# Patient Record
Sex: MALE | Race: WHITE
[De-identification: names, ages, dates, MRNs, and addresses within clinical notes are randomized per-mention and may not be internally consistent; named-entity substitution may affect disease eponyms.]

---

## 2017-09-06 ENCOUNTER — HOSPITAL ENCOUNTER (EMERGENCY)
Dept: HOSPITAL 74 - JER | Age: 67
Discharge: HOME | End: 2017-09-06
Payer: COMMERCIAL

## 2017-09-06 VITALS — SYSTOLIC BLOOD PRESSURE: 160 MMHG | DIASTOLIC BLOOD PRESSURE: 85 MMHG

## 2017-09-06 VITALS — BODY MASS INDEX: 29 KG/M2

## 2017-09-06 VITALS — TEMPERATURE: 98.8 F | HEART RATE: 91 BPM

## 2017-09-06 DIAGNOSIS — Z87.891: ICD-10-CM

## 2017-09-06 DIAGNOSIS — E78.00: ICD-10-CM

## 2017-09-06 DIAGNOSIS — I10: ICD-10-CM

## 2017-09-06 DIAGNOSIS — K21.9: ICD-10-CM

## 2017-09-06 DIAGNOSIS — Z01.31: Primary | ICD-10-CM

## 2017-09-06 DIAGNOSIS — J44.9: ICD-10-CM

## 2017-09-06 NOTE — PDOC
History of Present Illness





- General


Chief Complaint: Blood Pressure Problem


Stated Complaint: BLOOD PRESSURE


Time Seen by Provider: 09/06/17 22:29





- History of Present Illness


Initial Comments: 


09/06/17 22:49


CHIEF COMPLAINT:  blood pressure concern





HISTORY OF PRESENT ILLNESS: 67 years old male with a significant past medical 

history of COPD, benign lung mass s/p LLL resection, pneumonia, MI s/p stent, 

HTN, HLD, GERD, BPH, anemia who presents to the emergency department with 

concern of elevated blood pressure today. Patient states he had a dental 

procedure and was given anesthesia to the right side of his face today, and 

afterwards he was measuring his blood pressure at home and was concerned 

because it "kept going up and up" with the highest BP measuring in the 180s/

100s.  Patient denies any chest pain, shortness of breath, or palpitations, and 

on arrival to ED with BP of 160/85 patient states "oh ok, then I'm ok, I can go 

home now."  Patient continuously denies any other symptoms and states he will 

follow up with his PCP. 





No recent travel or sick contacts. 





PAST MEDICAL HISTORY: Denies past medical history





FAMILY HISTORY: Denies





SOCIAL HISTORY: Denies tobacco, alcohol, illicit drug use. 





SURGICAL HISTORY: Denies





ALLERGIES: levofloxacin





REVIEW OF SYSTEMS


General/Constitutional: Denies fever or chills. Denies weakness, weight change.





HEENT: Denies change in vision. Denies ear pain or discharge. Denies sore 

throat.





Cardiovascular: Denies chest pain or shortness of breath.





Respiratory: Denies cough, wheezing, or hemoptysis.





Gastrointestinal: Denies nausea, vomiting, diarrhea or constipation. Denies 

rectal bleeding.





Genitourinary: Denies dysuria, frequency, or change in urination.





Musculoskeletal: Denies joint or muscle swelling or pain. Denies neck or back 

pain.





Skin and breasts: Denies rash or easy bruising.





Neurologic: Denies headache, vertigo, loss of consciousness, or loss of 

sensation.








PHYSICAL EXAM


General Appearance: Well-appearing, appropriately dressed.  No apparent 

distress.





HEENT: EOMI, PERRLA, normal ENT inspection, normal voice, TMs normal, pharynx 

normal.  No conjunctival pallor.  No photophobia, scleral icterus.





Neck: Supple.  Trachea midline. No tenderness, rigidity, carotid bruit, stridor

, lymphadenopathy, or thyromegaly. 





Respiratory/Chest: Lungs CTAB. 





Cardiovascular: RRR. S1, S2.  





Gastrointestinal/Abdominal: Normal bowel sounds.  Abdomen soft, non-distended.  

No tenderness or rebound tenderness. No  organomegaly, pulsatile mass, guarding

, hernia, hepatomegaly, splenomegaly.





Musculoskeletal/Extremities:  Normal inspection. FROM of all extremities, 

normal capillary refill.  Pelvis Stable.  No CVA tenderness. No tenderness to 

extremities, pedal edema, swelling, erythema or deformity.





Integumentary: Appropriate color, dry, warm.  No cyanosis, erythema, jaundice 

or rash





Neurologic: CNs II-XII intact. Fully oriented, alert.  Appropriate mood/affect. 

Motor strength 5/5.  No appreciable EOM palsy, facial droop or sensory deficit.





09/06/17 23:03








Past History





- Past Medical History


Allergies/Adverse Reactions: 


 Allergies











Allergy/AdvReac Type Severity Reaction Status Date / Time


 


levofloxacin [From Levaquin] AdvReac Severe Rash Verified 01/23/17 01:31











Home Medications: 


Ambulatory Orders





Aspirin [ASA -] 81 mg PO DAILY 08/18/13 


Atorvastatin Ca [Lipitor] 40 mg PO HS 08/18/13 


Lisinopril [Prinivil] 40 mg PO BID 08/18/13 


Amlodipine Besylate [Norvasc -] 10 mg PO DAILY 12/09/13 


Ranolazine [Ranexa] 500 mg PO BID 12/09/13 


Pantoprazole Sodium [Protonix -] 40 mg PO DAILY 07/14/15 


Clopidogrel Bisulfate [Plavix -] 75 mg PO DAILY #0  07/15/15 


Metoprolol Succinate [Toprol Xl] 50 mg PO DAILY 09/06/17 


Tamsulosin HCl [Flomax] 0.4 mg PO DAILY 09/06/17 








Anemia: Yes


Asthma: No


Cancer: No


Cardiac Disorders: Yes (MI JUL 2013,STENT 8/13)


CVA: No


COPD: Yes (H/O PNEUMONIA,H/O TB 1998 COMPLETED INH,BENIGN LUNG MASS-LLL REMOVED)


CHF: No


Dementia: No


Diabetes: No


GI Disorders: Yes (GERD)


 Disorders: Yes (BPH)


HTN: Yes


Hypercholesterolemia: Yes


Liver Disease: No


Suicide Attempt (Hx): No


Seizures: No


Thyroid Disease: No





- Surgical History


Abdominal Surgery: No


Cardiac Surgery: Yes (cardiac cath 8/2013)


Cholecystectomy: Yes


Lung Surgery: Yes (lung resection,2000,BENIGN MASS)


Neurologic Surgery: No


Orthopedic Surgery: No





- Immunization History


Immunization Up to Date: No





- Psycho/Social/Smoking Cessation Hx


Anxiety: No


Suicidal Ideation: No


Smoking Status: Yes


Smoking History: Former smoker


Have you smoked in the past 12 months: No


Number of Cigarettes Smoked Daily: 0


If you are a former smoker, when did you quit?: 2000


Information on smoking cessation initiated: No


Hx Alcohol Use: No


Drug/Substance Use Hx: No


Substance Use Type: None


Hx Substance Use Treatment: No





*Physical Exam





- Vital Signs


 Last Vital Signs











Temp Pulse Resp BP Pulse Ox


 


 98.8 F   91 H  18   185/89   99 


 


 09/06/17 22:04  09/06/17 22:04  09/06/17 22:04  09/06/17 22:04  09/06/17 22:04














Medical Decision Making





- Medical Decision Making


09/06/17 23:15


67 years old male with a significant past medical history of COPD, benign lung 

mass s/p LLL resection, pneumonia, MI s/p stent, HTN, HLD, GERD, BPH, anemia 

who presents to the emergency department with concern of elevated blood 

pressure today.  





On exam patient denies any complaints and states he is reassured now that his 

blood pressure has lowered and wants to go home.  However patient is amenable 

to having an EKG performed. 





EKG NSR, no acute changes. 





Patient states he will f/u with his PCP for further monitoring and evaluation.  

Advised patient of signs and symptoms for return to ER; patient verbalized 

understanding and agrees to plan.

## 2017-09-07 NOTE — EKG
Test Reason : 

Blood Pressure : ***/*** mmHG

Vent. Rate : 068 BPM     Atrial Rate : 068 BPM

   P-R Int : 192 ms          QRS Dur : 122 ms

    QT Int : 418 ms       P-R-T Axes : 061 -56 -03 degrees

   QTc Int : 444 ms

 

NORMAL SINUS RHYTHM

LEFT ANTERIOR FASCICULAR BLOCK

ABNORMAL ECG

WHEN COMPARED WITH ECG OF 23-JAN-2017 02:22,

PREMATURE VENTRICULAR COMPLEXES ARE NO LONGER PRESENT

Confirmed by ALEISHA DELGADILLO, FRANCISCO ([id number]) on 9/7/2017 12:20:26 PM

 

Referred By:             Confirmed By:FRANCISCO MORAES MD

## 2017-09-21 ENCOUNTER — HOSPITAL ENCOUNTER (OUTPATIENT)
Dept: HOSPITAL 74 - JER | Age: 67
Setting detail: OBSERVATION
LOS: 1 days | Discharge: ANOTHER HOSPITAL | End: 2017-09-22
Attending: FAMILY MEDICINE | Admitting: FAMILY MEDICINE
Payer: COMMERCIAL

## 2017-09-21 VITALS — BODY MASS INDEX: 29 KG/M2

## 2017-09-21 DIAGNOSIS — I25.2: ICD-10-CM

## 2017-09-21 DIAGNOSIS — Z79.01: ICD-10-CM

## 2017-09-21 DIAGNOSIS — I10: Primary | ICD-10-CM

## 2017-09-21 DIAGNOSIS — Z90.2: ICD-10-CM

## 2017-09-21 DIAGNOSIS — Z95.5: ICD-10-CM

## 2017-09-21 DIAGNOSIS — I25.10: ICD-10-CM

## 2017-09-21 DIAGNOSIS — K21.9: ICD-10-CM

## 2017-09-21 DIAGNOSIS — J44.9: ICD-10-CM

## 2017-09-21 DIAGNOSIS — Z86.2: ICD-10-CM

## 2017-09-21 DIAGNOSIS — E78.5: ICD-10-CM

## 2017-09-21 DIAGNOSIS — N40.0: ICD-10-CM

## 2017-09-21 DIAGNOSIS — Z88.1: ICD-10-CM

## 2017-09-21 DIAGNOSIS — Z79.82: ICD-10-CM

## 2017-09-21 DIAGNOSIS — Z86.11: ICD-10-CM

## 2017-09-21 LAB
ALBUMIN SERPL-MCNC: 4.2 G/DL (ref 3.4–5)
ALP SERPL-CCNC: 63 U/L (ref 45–117)
ALT SERPL-CCNC: 29 U/L (ref 12–78)
ANION GAP SERPL CALC-SCNC: 11 MMOL/L (ref 8–16)
AST SERPL-CCNC: 12 U/L (ref 15–37)
BASOPHILS # BLD: 0.7 % (ref 0–2)
BILIRUB SERPL-MCNC: 1.1 MG/DL (ref 0.2–1)
CALCIUM SERPL-MCNC: 9.6 MG/DL (ref 8.5–10.1)
CK SERPL-CCNC: 65 IU/L (ref 39–308)
CK SERPL-CCNC: 77 IU/L (ref 39–308)
CO2 SERPL-SCNC: 27 MMOL/L (ref 21–32)
CREAT SERPL-MCNC: 1.5 MG/DL (ref 0.7–1.3)
DEPRECATED RDW RBC AUTO: 13.9 % (ref 11.9–15.9)
EOSINOPHIL # BLD: 1.4 % (ref 0–4.5)
GLUCOSE SERPL-MCNC: 107 MG/DL (ref 74–106)
INR BLD: 1.25 (ref 0.82–1.09)
MAGNESIUM SERPL-MCNC: 2.3 MG/DL (ref 1.8–2.4)
MCH RBC QN AUTO: 27.7 PG (ref 25.7–33.7)
MCHC RBC AUTO-ENTMCNC: 33.5 G/DL (ref 32–35.9)
MCV RBC: 82.7 FL (ref 80–96)
NEUTROPHILS # BLD: 71.9 % (ref 42.8–82.8)
PLATELET # BLD AUTO: 265 K/MM3 (ref 134–434)
PMV BLD: 7.2 FL (ref 7.5–11.1)
PROT SERPL-MCNC: 7.7 G/DL (ref 6.4–8.2)
PT PNL PPP: 13.8 SEC (ref 9.98–11.88)
TROPONIN I SERPL-MCNC: < 0.02 NG/ML (ref 0–0.05)
TROPONIN I SERPL-MCNC: < 0.02 NG/ML (ref 0–0.05)
WBC # BLD AUTO: 8.3 K/MM3 (ref 4–10)

## 2017-09-21 PROCEDURE — G0378 HOSPITAL OBSERVATION PER HR: HCPCS

## 2017-09-21 RX ADMIN — RANOLAZINE SCH MG: 500 TABLET, FILM COATED, EXTENDED RELEASE ORAL at 21:03

## 2017-09-21 RX ADMIN — NITROGLYCERIN SCH INCH: 20 OINTMENT TOPICAL at 23:37

## 2017-09-21 NOTE — PDOC
Attending Attestation





- HPI


HPI: 





09/21/17 14:31





The patient is a 67 year old male with history of hypertension, hyperlipidemia, 

CAD s/p MI s/p stent, sent to the ED by his PCP for 3 days of chest pain. He 

describes his chest pain as left sided, sharp/stabbing, not radiating or 

migrating, ranked 4/10 in intensity. No modifying factors. No nausea, vomiting, 

or diaphoresis. No lightheadedness, palpitations, SOB, or peripheral edema. 

Seen by his PCP this morning who sent him to ED for further evaluation. 





PCP: Dr. Salcedo





- Physicial Exam


PE: 





09/21/17 14:33


GENERAL: Awake, alert, and fully oriented, in no acute distress


HEAD: No signs of trauma


EYES: PERRLA, EOMI, sclera anicteric, conjunctiva clear


ENT: Auricles normal inspection, hearing grossly normal, nares patent, 

oropharynx clear without exudates. Moist mucosa


NECK: Normal ROM, supple, no lymphadenopathy, JVD, or masses


LUNGS: Breath sounds equal, clear to auscultation bilaterally.  No wheezes, and 

no crackles


HEART: Regular rate and rhythm, normal S1 and S2, no murmurs, rubs or gallops


ABDOMEN: Soft, nontender, normoactive bowel sounds.  No guarding, no rebound.  

No masses


EXTREMITIES: Normal range of motion, no edema.  No clubbing or cyanosis. No 

cords, erythema, or tenderness


NEUROLOGICAL: Cranial nerves II through XII grossly intact.  Normal speech, 

normal gait


SKIN: Warm, Dry, normal turgor, no rashes or lesions noted.








- Medical Decision Making





09/21/17 14:33





Documentation prepared by Katty Cruz, acting as medical scribe for Zaki Morales MD.





<Katty Cruz - Last Filed: 09/21/17 14:31>





- Resident


Resident Name: Kathi Cervantes





- ED Attending Attestation


I have performed the following: I have examined & evaluated the patient, The 

case was reviewed & discussed with the resident, I agree w/resident's findings 

& plan, Exceptions are as noted





- Medical Decision Making


09/21/17 14:25





 Vital Signs











Temp Pulse Resp BP Pulse Ox


 


 97.9 F   78   18   160/88   96 


 


 09/21/17 13:29  09/21/17 13:29  09/21/17 13:29  09/21/17 13:29  09/21/17 14:07











67-year-old male with history of hypertension, COPD, coronary disease status 

post stent sent in by his primary care physician Dr. Salcedo for chest pain. The 

patient reports 3 days constant midsternal sharp occasionally pressure-like 

chest pain or shortness of breath. Does not have an exertional component. 

Denies recent illnesses, fevers or chills. Denies history of pulmonary embolism 

or pleuritic component.





We'll need to rule out MI. EKG, chest x-ray, aspirin admission to the hospital 

for further evaluation.





<Zaki Morales - Last Filed: 09/21/17 14:58>





**Heart Score/ECG Review





- History


History: Moderately suspicious





- Electrocardiogram


EKG: Non specific repolarization disturbance





- Age


Age: >/= 65





- Risk Factors


Based on the list above the patient has:: >/=3 risk factors or Hx 

atherosclerotic disease


  ** #1


ECG reviewed & interpreted by me at: 14:55





09/21/17 14:58


NSR 60 TWI III LAFB, no std/janie,  msec





<Zaki Morales - Last Filed: 09/21/17 14:58>

## 2017-09-21 NOTE — PDOC
History of Present Illness





- General


Chief Complaint: Chest Pain


Stated Complaint: CHEST PAIN


Time Seen by Provider: 09/21/17 13:49





Past History





- Past Medical History


Allergies/Adverse Reactions: 


 Allergies











Allergy/AdvReac Type Severity Reaction Status Date / Time


 


levofloxacin [From Levaquin] AdvReac Severe Rash Verified 09/21/17 13:32











Home Medications: 


Ambulatory Orders





Aspirin [ASA -] 81 mg PO DAILY 08/18/13 


Atorvastatin Ca [Lipitor] 40 mg PO HS 08/18/13 


Lisinopril [Prinivil] 40 mg PO BID 08/18/13 


Amlodipine Besylate [Norvasc -] 10 mg PO DAILY 12/09/13 


Ranolazine [Ranexa] 500 mg PO BID 12/09/13 


Pantoprazole Sodium [Protonix -] 40 mg PO DAILY 07/14/15 


Clopidogrel Bisulfate [Plavix -] 75 mg PO DAILY #0  07/15/15 


Metoprolol Succinate [Toprol Xl] 50 mg PO DAILY 09/06/17 


Tamsulosin HCl [Flomax] 0.4 mg PO DAILY 09/06/17 








Anemia: Yes


Asthma: No


Cancer: No


Cardiac Disorders: Yes (MI JUL 2013,STENT 8/13)


CVA: No


COPD: Yes (H/O PNEUMONIA,H/O TB 1998 COMPLETED INH,BENIGN LUNG MASS-LLL REMOVED)


CHF: No


Dementia: No


Diabetes: No


GI Disorders: Yes (GERD)


 Disorders: Yes (BPH)


HTN: Yes


Hypercholesterolemia: Yes


Liver Disease: No


Seizures: No


Thyroid Disease: No





- Surgical History


Abdominal Surgery: No


Cardiac Surgery: Yes (cardiac cath 8/2013)


Cholecystectomy: Yes


Lung Surgery: Yes (lung resection,2000,BENIGN MASS)


Neurologic Surgery: No


Orthopedic Surgery: No





- Immunization History


Immunization Up to Date: No





- Suicide/Smoking/Psychosocial Hx


Smoking Status: Yes


Smoking History: Never smoked


Have you smoked in the past 12 months: No


Number of Cigarettes Smoked Daily: 0


If you are a former smoker, when did you quit?: 2000


Information on smoking cessation initiated: No


Hx Alcohol Use: No


Drug/Substance Use Hx: No


Substance Use Type: None


Hx Substance Use Treatment: No





*Physical Exam





- Vital Signs


 Last Vital Signs











Temp Pulse Resp BP Pulse Ox


 


 97.9 F   78   18   160/88   96 


 


 09/21/17 13:29  09/21/17 13:29  09/21/17 13:29  09/21/17 13:29  09/21/17 14:07
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
## 2017-09-21 NOTE — EKG
RX Alogliptin not covered under patients insurance - please advise    Test Reason : 

Blood Pressure : ***/*** mmHG

Vent. Rate : 061 BPM     Atrial Rate : 061 BPM

   P-R Int : 190 ms          QRS Dur : 120 ms

    QT Int : 434 ms       P-R-T Axes : 050 -52 009 degrees

   QTc Int : 436 ms

 

NORMAL SINUS RHYTHM

LEFT ANTERIOR FASCICULAR BLOCK

ABNORMAL ECG

WHEN COMPARED WITH ECG OF 06-SEP-2017 23:06,

NO SIGNIFICANT CHANGE WAS FOUND

Confirmed by FRANCISCO MORAES MD ([id number]) on 9/21/2017 3:57:44 PM

 

Referred By:             Confirmed By:FRANCISCO MORAES MD

## 2017-09-22 VITALS — HEART RATE: 63 BPM | TEMPERATURE: 97.7 F | DIASTOLIC BLOOD PRESSURE: 76 MMHG | SYSTOLIC BLOOD PRESSURE: 133 MMHG

## 2017-09-22 LAB
ALBUMIN SERPL-MCNC: 3.5 G/DL (ref 3.4–5)
ALP SERPL-CCNC: 56 U/L (ref 45–117)
ALT SERPL-CCNC: 27 U/L (ref 12–78)
ANION GAP SERPL CALC-SCNC: 8 MMOL/L (ref 8–16)
AST SERPL-CCNC: 13 U/L (ref 15–37)
BASOPHILS # BLD: 1.1 % (ref 0–2)
BILIRUB SERPL-MCNC: 0.9 MG/DL (ref 0.2–1)
CALCIUM SERPL-MCNC: 9 MG/DL (ref 8.5–10.1)
CO2 SERPL-SCNC: 29 MMOL/L (ref 21–32)
CREAT SERPL-MCNC: 1.4 MG/DL (ref 0.7–1.3)
DEPRECATED RDW RBC AUTO: 13.7 % (ref 11.9–15.9)
EOSINOPHIL # BLD: 3.2 % (ref 0–4.5)
GLUCOSE SERPL-MCNC: 99 MG/DL (ref 74–106)
MCH RBC QN AUTO: 27.8 PG (ref 25.7–33.7)
MCHC RBC AUTO-ENTMCNC: 33.9 G/DL (ref 32–35.9)
MCV RBC: 82.1 FL (ref 80–96)
NEUTROPHILS # BLD: 58.8 % (ref 42.8–82.8)
PLATELET # BLD AUTO: 232 K/MM3 (ref 134–434)
PMV BLD: 7.4 FL (ref 7.5–11.1)
PROT SERPL-MCNC: 6.7 G/DL (ref 6.4–8.2)
TROPONIN I SERPL-MCNC: < 0.02 NG/ML (ref 0–0.05)
WBC # BLD AUTO: 8.7 K/MM3 (ref 4–10)

## 2017-09-22 RX ADMIN — RANOLAZINE SCH MG: 500 TABLET, FILM COATED, EXTENDED RELEASE ORAL at 10:00

## 2017-09-22 RX ADMIN — NITROGLYCERIN SCH INCH: 20 OINTMENT TOPICAL at 13:59

## 2017-09-22 RX ADMIN — NITROGLYCERIN SCH INCH: 20 OINTMENT TOPICAL at 06:28

## 2017-09-22 NOTE — CON.CARD
Consult


Consult Specialty:: cardiology


Referred by:: Denisse


Reason for Consultation:: Chest pain





- History of Present Illness


Chief Complaint: Chest pain


History of Present Illness: 


The patient is a 67-year-old man, with a history of hypertension, hyperlipidemia

, GERD, COPD, coronary artery disease and myocardial infarction, prior stent to 

the proximal LAD and mid RCA in 2013, now presenting with recurrent chest pains 

for the past 4 days.





The patient underwent cardiac catheterization approximately one year ago, which 

showed patent stents. A recent stress test was normal. The symptoms improved 

with the nitroglycerin patch. Symptoms are not reproducible.





- History Source


History Provided By: Patient


Limitations to Obtaining History: No Limitations





- Past Medical History


CNS: No: Alzheimer's, CVA, Dementia, Migraine, Multiple Sclerosis, Peripheral 

Neuropathy, Parkinson's, Seizure, Syncope, TIA, Vertigo, Other


Cardio/Vascular: Yes: CAD (s/p stenting--LAD), HTN, Hyperlipdemia


Pulmonary: Yes: COPD, Other (lobectomy).  No: Asthma, Bronchitis, Cancer, O2 

Dependent, Pneumonia, Previously Intubated, Pulmonary Embolus, Pulmonary 

Fibrosis, Sleep Apnea


Gastrointestinal: No: Ascites, Cancer, Constipation, Crohn's Disease, 

Diverticulitis, Diverticulosis, Esophageal Varices, Gastritis, GERD, GI Bleed, 

Hemorrhoids, Hiatal Hernia, Inflamatory Bowel Disease, Irritable Bowel Disease, 

Pancreatitis, Peptic Ulcer Disease, Ulcerative Colitis, Other


Hepatobiliary: No: Cirrhosis, Cholelithiasis, Cholecystitis, Choledocholithiasis

, Hepatitis A, Hepatitis B, Hepatitis C, Other


Renal/: No: Renal Failure, Renal Inusuff, BPH, Cancer, Hematuria, Hemodialysis

, Neurogenic Bladder, Renal Calculi, UTI, Other


Heme/Onc: No: Anemia, B12 Deficiency, Bleeding Disorder, Cancer, Current 

Chemotherapy, Current Radiation Therapy, Hemochromatosis, Hypercoaguable State, 

Myeloproliferative Synd, Sickle Cell Disease, Sickle Cell Trait, 

Thrombocytopenia, Other


Infectious Disease: No: AIDS, C-Diff, Herpes Zoster, HIV, MRSA, STD's, 

Tuberculosis, VREF, Other


Psych: No: Addictions, Anxiety, Bipolar, Depression, Panic, Psychosis, 

Schizophrenia, Other


Musculoskeletal: No: Bursitis, Chronic low back pain, Hemiparesis, Hemiplegia, 

Osteoarthritis, Paraplegia, Other


Rheumatology: No: Fibromyalgia, Gout, Lupus, Rheumatoid Arthritis, Sarcoidosis, 

Vasculitis, Other


ENT: No: Allergic Rhinitis, Sinusitis, Other


Endocrine: No: Guadalupe's Disease, Cushing's Disease, Diabetes Insipidus, 

Diabetes Mellitus, Hyperparathyroidism, Hyperthyroidism, Hypothyroidism, 

Osteopenia, SIADH, Other


Dermatology: No: Basal Cell, Cellulitis, Eczema, Melanoma, Psoriasis, Squamous 

Cell, Other





- Alcohol/Substance Use


Hx Alcohol Use: No





- Smoking History


Smoking history: Never smoked


Have you smoked in the past 12 months: No


Aproximately how many cigarettes per day: 0


If you are a former smoker, when did you quit?: 2000





Home Medications





- Allergies


Allergies/Adverse Reactions: 


 Allergies











Allergy/AdvReac Type Severity Reaction Status Date / Time


 


levofloxacin [From Levaquin] AdvReac Severe Rash Verified 09/21/17 13:32














- Home Medications


Home Medications: 


Ambulatory Orders





Aspirin [ASA -] 81 mg PO DAILY 08/18/13 


Atorvastatin Ca [Lipitor] 40 mg PO HS 08/18/13 


Lisinopril [Prinivil] 40 mg PO BID 08/18/13 


Amlodipine Besylate [Norvasc -] 10 mg PO DAILY 12/09/13 


Ranolazine [Ranexa] 500 mg PO BID 12/09/13 


Pantoprazole Sodium [Protonix -] 40 mg PO DAILY 07/14/15 


Clopidogrel Bisulfate [Plavix -] 75 mg PO DAILY #0  07/15/15 


Metoprolol Succinate [Toprol Xl] 50 mg PO DAILY 09/06/17 


Tamsulosin HCl [Flomax] 0.4 mg PO DAILY 09/06/17 











Review of Systems





- Review of Systems


Constitutional: denies: No Symptoms, Chills, Diaphoresis, Fever, Lethargy, Loss 

of Appetite, Malaise, Night Sweats, Unintentional Wgt. Loss, Weakness, Other


Eyes: denies: No Symptoms, Blind Spots, Blurred Vision, Double Vision, Eye Pain

, Floaters, Photophobia, Recent Change in Vision, Other


HENT: denies: No Symptoms, Difficult Swallowing, Ear Discharge, Ear Pain, 

Epistaxis, Gingival Bleeding, Hearing Loss, Mouth Swelling, Nasal Congestion, 

Ocular Prosthesis, Throat Pain, Toothache, Ringing in Ears, Other


Neck: reports: No Symptoms


Cardiovascular: reports: Chest Pain


Respiratory: reports: No Symptoms


Gastrointestinal: reports: No Symptoms


Genitourinary: reports: No Symptoms


Breasts: reports: No Symptoms Reported


Musculoskeletal: reports: No Symptoms


Integumentary: reports: No Symptoms


Neurological: reports: No Symptoms


Endocrine: reports: No Symptoms


Vital Signs: 


 Vital Signs











Temperature  98.0 F   09/22/17 10:00


 


Pulse Rate  60   09/22/17 10:00


 


Respiratory Rate  18   09/22/17 10:00


 


Blood Pressure  123/66   09/22/17 10:00


 


O2 Sat by Pulse Oximetry (%)  94 L  09/22/17 10:00











Constitutional: Yes: Well Nourished, No Distress, Calm


Eyes: Yes: WNL, Conjunctiva Clear


HENT: Yes: WNL, Atraumatic, Normocephalic


Neck: Yes: WNL, Supple, Trachea Midline


Respiratory: Yes: WNL, Regular, CTA Bilaterally


Gastrointestinal: Yes: WNL, Normal Bowel Sounds, Soft


Renal/: Yes: WNL


Cardiovascular: Yes: Regular Rate and Rhythm


JVD: No


Carotid Bruit: No


PMI: Non-Displaced


Heart Sounds: Yes: S1, S2


Musculoskeletal: Yes: WNL


Extremities: Yes: WNL


Edema: No


Peripheral Pulses WNL: No


Integumentary: Yes: WNL


Neurological: Yes: WNL


...Motor Strength: WNL


Psychiatric: Yes: WNL





- Other Data


Labs, Other Data: 


 CBC, BMP





 09/22/17 05:30 





 09/22/17 05:30 





 INR, PTT











INR  1.25  (0.82-1.09)  H  09/21/17  14:30    








 Troponin, BNP











  09/21/17 09/22/17





  19:20 05:30


 


Troponin I  < 0.02  < 0.02








 Troponin, BNP











  09/21/17 09/22/17





  19:20 05:30


 


Troponin I  < 0.02  < 0.02














Assessment/Plan


67-year-old man with known coronary artery disease and prior stents, now 

presenting with recurrent chest pain. The patient ruled out for myocardial 

infarction. There is no CHF. No acute ECG changes. The patient is in sinus 

rhythm.





The symptoms are persisting for the past 4 days. The improved with the 

nitroglycerin patch.





Keep the patient NPO


Continue current regimen.


The patient will be transferred to [facility] for cardiac 

catheterization today.


Stable for transfer.

## 2017-09-22 NOTE — HP
Admitting History and Physical





- Admission


History of Present Illness: 


68yo M with PMH of MI with stent, htn, hld, gerd, COPD, presents c/o chest pain 

x 3 days.  Pain is in Left chest, described as a sharp/stabbing pain, non-

radiating, rated 4/10.  Pain is not worse with position change,or eating.  Pain 

is not reproducible on palpation.  Pt did not take any medication to alleviate 

the pain.  He was seen in the office given nitro with some relief


This am still with chest pain with some improvement





- Past Medical History


Cardiovascular: Yes: CAD (s/p stenting--LAD), HTN, Hyperlipdemia


Pulmonary: Yes: COPD, Other (lobectomy)





- Smoking History


Smoking history: Never smoked


Have you smoked in the past 12 months: No


Aproximately how many cigarettes per day: 0


If you are a former smoker, when did you quit?: 2000





- Alcohol/Substance Use


Hx Alcohol Use: No





Home Medications





- Allergies


Allergies/Adverse Reactions: 


 Allergies











Allergy/AdvReac Type Severity Reaction Status Date / Time


 


levofloxacin [From Levaquin] AdvReac Severe Rash Verified 09/21/17 13:32














- Home Medications


Home Medications: 


Ambulatory Orders





Aspirin [ASA -] 81 mg PO DAILY 08/18/13 


Atorvastatin Ca [Lipitor] 40 mg PO HS 08/18/13 


Lisinopril [Prinivil] 40 mg PO BID 08/18/13 


Amlodipine Besylate [Norvasc -] 10 mg PO DAILY 12/09/13 


Ranolazine [Ranexa] 500 mg PO BID 12/09/13 


Pantoprazole Sodium [Protonix -] 40 mg PO DAILY 07/14/15 


Clopidogrel Bisulfate [Plavix -] 75 mg PO DAILY #0  07/15/15 


Metoprolol Succinate [Toprol Xl] 50 mg PO DAILY 09/06/17 


Tamsulosin HCl [Flomax] 0.4 mg PO DAILY 09/06/17 











Review of Systems





- Review of Systems


Cardiovascular: reports: Chest Pain.  denies: Palpitations


Respiratory: reports: SOB on Exertion


Gastrointestinal: reports: No Symptoms


Genitourinary: reports: No Symptoms





Physical Examination


Vital Signs: 


 Vital Signs











Temperature  98.2 F   09/22/17 06:00


 


Pulse Rate  63   09/22/17 06:00


 


Respiratory Rate  20   09/22/17 06:00


 


Blood Pressure  116/67   09/22/17 06:00


 


O2 Sat by Pulse Oximetry (%)  92 L  09/21/17 21:00











Cardiovascular: Yes: Regular Rate and Rhythm


Respiratory: Yes: Regular, CTA Bilaterally


Gastrointestinal: Yes: Normal Bowel Sounds, Soft.  No: Tenderness


Edema: No


Labs: 


 CBC, BMP





 09/22/17 05:30 





 09/22/17 05:30 











Problem List





- Problems


(1) Atypical chest pain


Assessment/Plan: 


CE NEGATIVE


D/W DR GIBBS--TRANSFER FOR CATH AT [facility]


Code(s): R07.89 - OTHER CHEST PAIN





(2) Hypertension


Assessment/Plan: 


MONITOR ON CURRENT MEDS


ON ASA/PLAVIX


ON NITROPASTE


Code(s): I10 - ESSENTIAL (PRIMARY) HYPERTENSION   





(3) CAD (coronary artery disease)


Assessment/Plan: 


AS ABOVE


Code(s): I25.10 - ATHSCL HEART DISEASE OF NATIVE CORONARY ARTERY W/O ANG PCTRS

## 2018-03-02 ENCOUNTER — HOSPITAL ENCOUNTER (OUTPATIENT)
Dept: HOSPITAL 74 - JER | Age: 68
Setting detail: OBSERVATION
LOS: 1 days | Discharge: HOME | End: 2018-03-03
Attending: FAMILY MEDICINE | Admitting: FAMILY MEDICINE
Payer: COMMERCIAL

## 2018-03-02 VITALS — BODY MASS INDEX: 27.6 KG/M2

## 2018-03-02 DIAGNOSIS — I25.2: ICD-10-CM

## 2018-03-02 DIAGNOSIS — Z87.891: ICD-10-CM

## 2018-03-02 DIAGNOSIS — D64.9: ICD-10-CM

## 2018-03-02 DIAGNOSIS — Z88.1: ICD-10-CM

## 2018-03-02 DIAGNOSIS — I10: ICD-10-CM

## 2018-03-02 DIAGNOSIS — Z79.01: ICD-10-CM

## 2018-03-02 DIAGNOSIS — K62.5: Primary | ICD-10-CM

## 2018-03-02 DIAGNOSIS — N40.0: ICD-10-CM

## 2018-03-02 DIAGNOSIS — K21.9: ICD-10-CM

## 2018-03-02 DIAGNOSIS — E78.5: ICD-10-CM

## 2018-03-02 DIAGNOSIS — I25.10: ICD-10-CM

## 2018-03-02 DIAGNOSIS — J44.9: ICD-10-CM

## 2018-03-02 DIAGNOSIS — Z86.11: ICD-10-CM

## 2018-03-02 DIAGNOSIS — Z95.5: ICD-10-CM

## 2018-03-02 DIAGNOSIS — Z79.82: ICD-10-CM

## 2018-03-02 LAB
ALBUMIN SERPL-MCNC: 3.7 G/DL (ref 3.4–5)
ALP SERPL-CCNC: 42 U/L (ref 45–117)
ALT SERPL-CCNC: 18 U/L (ref 12–78)
ANION GAP SERPL CALC-SCNC: 8 MMOL/L (ref 8–16)
APTT BLD: 28.2 SECONDS (ref 26.9–34.4)
AST SERPL-CCNC: 14 U/L (ref 15–37)
BASOPHILS # BLD: 0.9 % (ref 0–2)
BILIRUB SERPL-MCNC: 0.8 MG/DL (ref 0.2–1)
BUN SERPL-MCNC: 20 MG/DL (ref 7–18)
CALCIUM SERPL-MCNC: 8.4 MG/DL (ref 8.5–10.1)
CHLORIDE SERPL-SCNC: 103 MMOL/L (ref 98–107)
CO2 SERPL-SCNC: 27 MMOL/L (ref 21–32)
CREAT SERPL-MCNC: 1.4 MG/DL (ref 0.7–1.3)
DEPRECATED RDW RBC AUTO: 13.3 % (ref 11.9–15.9)
DEPRECATED RDW RBC AUTO: 13.4 % (ref 11.9–15.9)
DEPRECATED RDW RBC AUTO: 13.5 % (ref 11.9–15.9)
EOSINOPHIL # BLD: 1.7 % (ref 0–4.5)
GLUCOSE SERPL-MCNC: 144 MG/DL (ref 74–106)
HCT VFR BLD CALC: 36.5 % (ref 35.4–49)
HCT VFR BLD CALC: 36.7 % (ref 35.4–49)
HCT VFR BLD CALC: 36.8 % (ref 35.4–49)
HGB BLD-MCNC: 12.5 GM/DL (ref 11.7–16.9)
HGB BLD-MCNC: 12.7 GM/DL (ref 11.7–16.9)
HGB BLD-MCNC: 12.8 GM/DL (ref 11.7–16.9)
INR BLD: 1.19 (ref 0.82–1.09)
LIPASE SERPL-CCNC: 174 U/L (ref 73–393)
LYMPHOCYTES # BLD: 12.6 % (ref 8–40)
MCH RBC QN AUTO: 28 PG (ref 25.7–33.7)
MCH RBC QN AUTO: 28.2 PG (ref 25.7–33.7)
MCH RBC QN AUTO: 28.4 PG (ref 25.7–33.7)
MCHC RBC AUTO-ENTMCNC: 34.2 G/DL (ref 32–35.9)
MCHC RBC AUTO-ENTMCNC: 34.5 G/DL (ref 32–35.9)
MCHC RBC AUTO-ENTMCNC: 34.7 G/DL (ref 32–35.9)
MCV RBC: 81.8 FL (ref 80–96)
MCV RBC: 81.9 FL (ref 80–96)
MCV RBC: 81.9 FL (ref 80–96)
MONOCYTES # BLD AUTO: 11 % (ref 3.8–10.2)
NEUTROPHILS # BLD: 73.8 % (ref 42.8–82.8)
PLATELET # BLD AUTO: 213 K/MM3 (ref 134–434)
PLATELET # BLD AUTO: 224 K/MM3 (ref 134–434)
PLATELET # BLD AUTO: 231 K/MM3 (ref 134–434)
PMV BLD: 7.2 FL (ref 7.5–11.1)
PMV BLD: 7.4 FL (ref 7.5–11.1)
PMV BLD: 7.5 FL (ref 7.5–11.1)
POTASSIUM SERPLBLD-SCNC: 3.2 MMOL/L (ref 3.5–5.1)
PROT SERPL-MCNC: 6.7 G/DL (ref 6.4–8.2)
PT PNL PPP: 13.5 SEC (ref 9.98–11.88)
RBC # BLD AUTO: 4.46 M/MM3 (ref 4–5.6)
RBC # BLD AUTO: 4.48 M/MM3 (ref 4–5.6)
RBC # BLD AUTO: 4.49 M/MM3 (ref 4–5.6)
SODIUM SERPL-SCNC: 138 MMOL/L (ref 136–145)
WBC # BLD AUTO: 6.1 K/MM3 (ref 4–10)
WBC # BLD AUTO: 6.7 K/MM3 (ref 4–10)
WBC # BLD AUTO: 7.2 K/MM3 (ref 4–10)

## 2018-03-02 PROCEDURE — G0378 HOSPITAL OBSERVATION PER HR: HCPCS

## 2018-03-02 PROCEDURE — 3E033GC INTRODUCTION OF OTHER THERAPEUTIC SUBSTANCE INTO PERIPHERAL VEIN, PERCUTANEOUS APPROACH: ICD-10-PCS | Performed by: FAMILY MEDICINE

## 2018-03-02 RX ADMIN — POLYETHYLENE GLYCOL 3350 SCH GM: 17 POWDER, FOR SOLUTION ORAL at 21:00

## 2018-03-02 RX ADMIN — RANOLAZINE SCH MG: 500 TABLET, FILM COATED, EXTENDED RELEASE ORAL at 21:00

## 2018-03-02 RX ADMIN — POTASSIUM CHLORIDE, DEXTROSE MONOHYDRATE AND SODIUM CHLORIDE SCH MLS/HR: 150; 5; 450 INJECTION, SOLUTION INTRAVENOUS at 13:18

## 2018-03-02 RX ADMIN — LISINOPRIL SCH MG: 20 TABLET ORAL at 21:00

## 2018-03-02 NOTE — PDOC
Attending Attestation





- Resident


Resident Name: Dedrick Gibson





- HPI


HPI: 





03/02/18 11:32


Pt presents to the ED complaining of a several day history of bright red blood 

per rectum that became acutely worse today.  Patient has a history of CAD, and 

complains of lightheadness but not chest pain or worsening shortness of breath. 





- Physicial Exam


PE: 





03/02/18 11:35


Agree with resident exam.  Patient is alert and oriented in no acute distresss.

  Abdomen is soft, non distended and non tender. 





- Medical Decision Making





03/02/18 11:35


Pt presents to the ED with bright red blood per rectum.  Denies other 

complaints.  + gross blood on exam, but is hemodyanmically stable without 

severe anemia.  Will admit to medicine for continued monitoring and serial 

hemoglobins.

## 2018-03-02 NOTE — CON.CARD
Consult


Consult Specialty:: Cardiology


Reason for Consultation:: History of CAD.  GI BLeeding





- History of Present Illness


Chief Complaint: No present cardiac complaits


History of Present Illness: 








This is a 67 year old male with a PMH of HTN, HLD, COPD, former smoker, MI s/p 

coronary stenting 2013, Anemia who presents for evaluation of rectal bleeding. 

e notes that over the past 2 days he has been experiencing constant BRBPR with 

more frequent bowel movements that he notes this morning as consisting mostly 

of blood prompting his presentation to the ED for evaluation.  He was on 

aspirin and plavix. 











- Past Medical History


Cardio/Vascular: Yes: CAD (s/p stenting--LAD), HTN, Hyperlipdemia


Pulmonary: Yes: COPD, Other (lobectomy).  No: Asthma, Bronchitis, Cancer, O2 

Dependent, Pneumonia, Previously Intubated, Pulmonary Embolus, Pulmonary 

Fibrosis, Sleep Apnea


Gastrointestinal: Yes: GERD





- Alcohol/Substance Use


Hx Alcohol Use: No





- Smoking History


Smoking history: Never smoked


Have you smoked in the past 12 months: No


Aproximately how many cigarettes per day: 0


If you are a former smoker, when did you quit?: 2000





Home Medications





- Allergies


Allergies/Adverse Reactions: 


 Allergies











Allergy/AdvReac Type Severity Reaction Status Date / Time


 


levofloxacin [From Levaquin] AdvReac Severe Rash Verified 03/02/18 08:07














- Home Medications


Home Medications: 


Ambulatory Orders





Aspirin [ASA -] 81 mg PO DAILY 08/18/13 


Atorvastatin Ca [Lipitor] 40 mg PO HS 08/18/13 


Lisinopril [Prinivil] 40 mg PO BID 08/18/13 


Amlodipine Besylate [Norvasc -] 10 mg PO DAILY 12/09/13 


Ranolazine [Ranexa] 500 mg PO BID 12/09/13 


Pantoprazole Sodium [Protonix -] 40 mg PO DAILY 07/14/15 


Clopidogrel Bisulfate [Plavix -] 75 mg PO DAILY #0 07/15/15 


Metoprolol Succinate [Toprol Xl] 50 mg PO DAILY 09/06/17 


Tamsulosin HCl [Flomax] 0.4 mg PO DAILY 09/06/17 











Review of Systems


Findings/Remarks: 





As per HPI


Vital Signs: 


 Vital Signs











Temperature  98.1 F   03/02/18 13:25


 


Pulse Rate  56 L  03/02/18 13:25


 


Respiratory Rate  20   03/02/18 13:25


 


Blood Pressure  118/53   03/02/18 13:25


 


O2 Sat by Pulse Oximetry (%)  98   03/02/18 13:25











Constitutional: Yes: Well Nourished, No Distress


Eyes: Yes: WNL


HENT: Yes: WNL


Respiratory: Yes: CTA Bilaterally


Gastrointestinal: Yes: Soft


Cardiovascular: Yes: Regular Rate and Rhythm (NL S1 S2, No MRHG)


JVD: No


Extremities: Yes: WNL


Edema: No


Neurological: Yes: Alert, Oriented (Nonfocal)





- Other Data


Labs, Other Data: 


 CBC, BMP





 03/02/18 13:10 





 03/02/18 09:00 





 INR, PTT











INR  1.19  (0.82-1.09)  H  03/02/18  09:00    








 Troponin, BNP











  03/02/18





  09:00


 


Troponin I  < 0.02








 Troponin, BNP











  03/02/18





  09:00


 


Troponin I  < 0.02














Assessment/Plan








Cardiovascular


History of CAD with coronary stenting in 2013


GI bleedings - work-up in progress


Presently no chest pain


Can hold aspirin and Plavix for now


Would continue Toprol XL 50 mg daily and Ranexa 500 mg PO BID as antianginals


Continue Statin for secondary prevention


HTN - continue Lisinopril 40 mg PO BID and amlodipine 10 mg PO daily





Will follow with you

## 2018-03-02 NOTE — CON.GI
Consult


Consult Specialty:: Gastroenterology


Referred by:: Dr Salcedo


Reason for Consultation:: Rectal bleeding





- History of Present Illness


Chief Complaint: Rectal bleeding for the past week.


History of Present Illness: 





67M has noted spotting of blood intermittently with defecation for the past 

week. He has been constipated and straining to defecate. Yesterday and today he 

had larger volume bleeding. He is on Plavix and aspirin. He denies abdominal 

pain. Appetite has been good and weight steady. No recent narrowing of stools. 

He had an EGD and colonoscopy with my associate Dr Sacnhez Roach on 3/22/12 when 

gastric bile reflux was noted and a fundal polyp removed. Colonoscopy revealed  

moderate sized internal hemorrhoids. He has not had any bleeding since this 

morning. His Hct is 36 





- History Source


History Provided By: Patient


Limitations to Obtaining History: No Limitations





- Past Medical History


CNS: Yes: Other (Terumo Angio Seal applied to cerebral aneurysm 18 [facility]

)


Cardio/Vascular: Yes: Aneurysm (Terumo AngioSeal applied to cerebral aneurysm  [facility]), CAD (s/p stenting--LAD  following ? MI), HTN, Hyperlipdemia

, MI ()


Pulmonary: Yes: COPD, Other (left lobectomy for infection, sleep apnea, 

pulmonary fibrosis ).  No: Asthma, Bronchitis, Cancer, O2 Dependent, Pneumonia, 

Previously Intubated, Pulmonary Embolus, Pulmonary Fibrosis, Sleep Apnea


Gastrointestinal: Yes: GERD


Renal/: Yes: Renal Calculi





- Past Surgical History


Past Surgical History: Yes: Cholecystectomy (lap choly), Colonoscopy, Hernia 

Repair ([facility] ), Upper Endoscopy


Additional Surgical History: Left lung lobectomy for infection 





- Alcohol/Substance Use


Hx Alcohol Use: Yes (rare glass of wine)





- Smoking History


Smoking history: Former smoker


Have you smoked in the past 12 months: No


Aproximately how many cigarettes per day: 0


If you are a former smoker, when did you quit?: 





- Social History


Usual Living Arrangement: With Spouse


ADL: Independent


Occupation: retired busdriver


Place of Birth: Other (born in [place])


History of Recent Travel: No





Home Medications





- Allergies


Allergies/Adverse Reactions: 


 Allergies











Allergy/AdvReac Type Severity Reaction Status Date / Time


 


levofloxacin [From Levaquin] AdvReac Severe Rash Verified 03/02/18 08:07














- Home Medications


Home Medications: 


Ambulatory Orders





Aspirin [ASA -] 81 mg PO DAILY 13 


Atorvastatin Ca [Lipitor] 40 mg PO HS 13 


Lisinopril [Prinivil] 40 mg PO BID 13 


Amlodipine Besylate [Norvasc -] 10 mg PO DAILY 13 


Ranolazine [Ranexa] 500 mg PO BID 13 


Pantoprazole Sodium [Protonix -] 40 mg PO DAILY 07/14/15 


Clopidogrel Bisulfate [Plavix -] 75 mg PO DAILY #0 07/15/15 


Metoprolol Succinate [Toprol Xl] 50 mg PO DAILY 17 


Tamsulosin HCl [Flomax] 0.4 mg PO DAILY 17 











Family Disease History





- Family Disease History


Family Disease History: Diabetes: Mother ( 81 ? MI), Heart Disease: Father (

 MI 56, alcohol abuse)


Other Family History: no cancer





Review of Systems





- Review of Systems


Constitutional: reports: No Symptoms


Eyes: reports: No Symptoms


HENT: reports: No Symptoms


Neck: reports: No Symptoms


Cardiovascular: reports: No Symptoms


Respiratory: reports: No Symptoms


Gastrointestinal: reports: Constipation, Rectal Bleeding


Genitourinary: reports: No Symptoms


Musculoskeletal: reports: No Symptoms


Neurological: reports: No Symptoms





Physical Exam-GI


Vital Signs: 


 Vital Signs











Temperature  98.1 F   18 13:25


 


Pulse Rate  56 L  18 13:25


 


Respiratory Rate  20   18 13:25


 


Blood Pressure  118/53   18 13:25


 


O2 Sat by Pulse Oximetry (%)  98   18 13:25








CBC,CMP











WBC  7.2 K/mm3 (4.0-10.0)   18  13:10    


 


RBC  4.46 M/mm3 (4.00-5.60)   18  13:10    


 


Hgb  12.5 GM/dL (11.7-16.9)   18  13:10    


 


Hct  36.5 % (35.4-49)   18  13:10    


 


MCV  81.9 fl (80-96)   18  13:10    


 


MCH  28.0 pg (25.7-33.7)   18  13:10    


 


MCHC  34.2 g/dl (32.0-35.9)   18  13:10    


 


RDW  13.5 % (11.9-15.9)   18  13:10    


 


Plt Count  231 K/MM3 (134-434)   18  13:10    


 


MPV  7.4 fl (7.5-11.1)  L  18  13:10    


 


Neutrophils %  73.8 % (42.8-82.8)  D 18  09:00    


 


Lymphocytes %  12.6 % (8-40)  D 18  09:00    


 


Monocytes %  11.0 % (3.8-10.2)  H  18  09:00    


 


Eosinophils %  1.7 % (0-4.5)   18  09:00    


 


Basophils %  0.9 % (0-2.0)   18  09:00    


 


Sodium  138 mmol/L (136-145)   18  09:00    


 


Potassium  3.2 mmol/L (3.5-5.1)  L D 18  09:00    


 


Chloride  103 mmol/L ()   18  09:00    


 


Carbon Dioxide  27 mmol/L (21-32)   18  09:00    


 


Anion Gap  8  (8-16)   18  09:00    


 


BUN  20 mg/dL (7-18)  H  18  09:00    


 


Creatinine  1.4 mg/dL (0.7-1.3)  H  18  09:00    


 


Creat Clearance w eGFR  50.40  (>60)   18  09:00    


 


Random Glucose  144 mg/dL ()  H D 18  09:00    


 


Calcium  8.4 mg/dL (8.5-10.1)  L  18  09:00    


 


Ferritin  17.461 ng/ml (16.4-293.9)   18  09:00    


 


Total Bilirubin  0.8 mg/dL (0.2-1.0)   18  09:00    


 


AST  14 U/L (15-37)  L  18  09:00    


 


ALT  18 U/L (12-78)  D 18  09:00    


 


Alkaline Phosphatase  42 U/L ()  L D 18  09:00    


 


Creatine Kinase  128 IU/L ()   18  09:00    


 


Troponin I  < 0.02 ng/ml (0.00-0.05)   18  09:00    


 


Total Protein  6.7 g/dl (6.4-8.2)   18  09:00    


 


Albumin  3.7 g/dl (3.4-5.0)   18  09:00    


 


Lipase  174 U/L ()   18  09:00    








 Current Medications











Generic Name Dose Route Start Last Admin





  Trade Name Freq  PRN Reason Stop Dose Admin


 


Amlodipine Besylate  10 mg  18 10:00  





  Norvasc -  PO   





  DAILY SABRINA   


 


Atorvastatin Calcium  40 mg  18 22:00  





  Lipitor -  PO   





  HS SABRINA   


 


Potassium Chloride/Dextrose/Sod Cl  20 meq in 1,000 mls @ 75 mls/hr  18 12

:30  18 13:18





  D5-1/2ns+20 Meq Kcl -  IV   75 mls/hr





  ASDIR SABRINA   Administration


 


Lisinopril  40 mg  18 22:00  





  Prinivil  PO   





  BID SABRINA   


 


Metoprolol Succinate  50 mg  18 10:00  





  Toprol Xl -  PO   





  DAILY SABRINA   


 


Pantoprazole Sodium  40 mg  18 10:00  





  Protonix -  PO   





  DAILY SABRINA   


 


Ranolazine  500 mg  18 22:00  





  Ranexa -  PO   





  BID SABRINA   


 


Tamsulosin HCl  0.4 mg  18 08:30  





  Flomax -  PO   





  DAILY@0830 SABRINA   











Constitutional: Yes: No Distress


Eyes: Yes: Conjunctiva Clear


HENT: Yes: Normocephalic


Neck: Yes: Supple


Cardiovascular: Yes: Regular Rate and Rhythm


Respiratory: Yes: CTA Bilaterally, Other (left mediastinoscopy incision)


Gastrointestinal Inspection: Yes: Scars (RIH and laparoscopic incisions)


...Auscultate: Yes: Normoactive Bowel Sounds


...Palpate: Yes: Soft, Other (nontender)


...Rectal Exam: Yes: Guaiac Trace, Hemorrhoids/External


Genitourinary: Yes: Other (2+ prostate)


Musculoskeletal: Yes: WNL


Extremities: Yes: WNL


Edema: No


Peripheral Pulses WNL: Yes


Neurological: Yes: Alert, Oriented


Labs: 


 CBC, BMP





 18 13:10 





 18 09:00 





 INR, PTT











INR  1.19  (0.82-1.09)  H  18  09:00    














Problem List





- Problems


(1) Rectal bleed


Assessment/Plan: 


The pattern of bleeding on Plavix, associated with a normal Hb and constipation 

is most suggestive of hemorrhoids. I will allow him to eat solids. If no major 

bleeding develops overnight then he can be discharged in the AM to pursue 

colonoscopy as an outpatient as he needs to have Plavix stopped for 8 days 

prior to the procedure. Would send home on Miralax to alleviate constipation 

and hemorrhoidal bleeding. I discussed the case with Dr Salcedo. 


Code(s): K62.5 - HEMORRHAGE OF ANUS AND RECTUM

## 2018-03-02 NOTE — PDOC
History of Present Illness





- General


Chief Complaint: Rectal Bleed


Stated Complaint: RECTAL BLEEDING


Time Seen by Provider: 03/02/18 08:24





- History of Present Illness


Initial Comments: 





03/02/18 09:05


The patient is a 67 year old male with a history of HTN, HLD, COPD, MI s/p 

stenting 2013, Anemia who presents for evaluation of rectal bleeding.  The 

patient reports intermittent BRBPR over the past month.  He notes that over the 

past 2 days he has been experiencing constant BRBPR with more frequent bowel 

movements that he notes this morning as consisting mostly of blood prompting 

his presentation to the ED for evaluation.  He notes that he is on aspirin and 

plavix.  He states that he had a colonoscopy 3-5 years ago that was reportedly 

unremarkable.  Otherwise he denies any symptoms similar to this in the past.  

He endorses some lightheadedness as well as some SOB which is unchanged from 

his baseline due to his COPD.  He otherwise denies fevers, chills, chest pain, 

nausea, vomiting, abdominal pain, pain with bowel movements, or changes with 

urination.





Past History





- Past Medical History


Allergies/Adverse Reactions: 


 Allergies











Allergy/AdvReac Type Severity Reaction Status Date / Time


 


levofloxacin [From Levaquin] AdvReac Severe Rash Verified 03/02/18 08:07











Home Medications: 


Ambulatory Orders





Aspirin [ASA -] 81 mg PO DAILY 08/18/13 


Atorvastatin Ca [Lipitor] 40 mg PO HS 08/18/13 


Lisinopril [Prinivil] 40 mg PO BID 08/18/13 


Amlodipine Besylate [Norvasc -] 10 mg PO DAILY 12/09/13 


Ranolazine [Ranexa] 500 mg PO BID 12/09/13 


Pantoprazole Sodium [Protonix -] 40 mg PO DAILY 07/14/15 


Clopidogrel Bisulfate [Plavix -] 75 mg PO DAILY #0 07/15/15 


Metoprolol Succinate [Toprol Xl] 50 mg PO DAILY 09/06/17 


Tamsulosin HCl [Flomax] 0.4 mg PO DAILY 09/06/17 








Anemia: Yes


Asthma: No


Cancer: No


Cardiac Disorders: Yes (MI JUL 2013,STENT 8/13)


CVA: No


COPD: Yes (H/O PNEUMONIA,H/O TB 1998 COMPLETED INH,BENIGN LUNG MASS-LLL REMOVED)


CHF: No


Dementia: No


Diabetes: No


GI Disorders: Yes (GERD)


 Disorders: Yes (BPH)


HTN: Yes


Hypercholesterolemia: Yes


Liver Disease: No


Seizures: No


Thyroid Disease: No





- Surgical History


Abdominal Surgery: No


Cardiac Surgery: Yes (cardiac cath 8/2013)


Cholecystectomy: Yes


Lung Surgery: Yes (lung resection,2000,BENIGN MASS)


Neurologic Surgery: No


Orthopedic Surgery: No





- Immunization History


Immunization Up to Date: No





- Suicide/Smoking/Psychosocial Hx


Smoking Status: Yes


Smoking History: Never smoked


Have you smoked in the past 12 months: No


Number of Cigarettes Smoked Daily: 0


If you are a former smoker, when did you quit?: 2000


Information on smoking cessation initiated: No


Hx Alcohol Use: No


Drug/Substance Use Hx: No


Substance Use Type: None


Hx Substance Use Treatment: No





**Review of Systems





- Review of Systems


Comments:: 





03/02/18 09:10


Constitutional: No fevers, chills, fatigue, malaise


HEENT: No Rhinorrhea, nasal congestion, visual changes


Cardiovascular: Lightheadedness.  No chest pain, syncope, palpitations,


Respiratory: SOB.  No Cough, Hemoptysis,


Gastrointestinal: BRBPR.  No Abdominal pain, Nausea, Vomiting, Constipation, 

Diarrhea, Melena


Genitourinary: No Dysuria, Frequency, Urgency, Hesitancy, Hematuria, Flank pain


Musculoskeletal: No Myalgia, arthralgia


Skin: No rashes, itching, bruising, pallor


Neurologic: No Headache, Dizziness, Numbness, Weakness, or Tingling


Psychiatric: No Hallucinations. No SI or HI








*Physical Exam





- Vital Signs


 Last Vital Signs











Temp Pulse Resp BP Pulse Ox


 


 97.7 F   87   17   136/73   97 


 


 03/02/18 08:08  03/02/18 08:08  03/02/18 08:08  03/02/18 08:08  03/02/18 08:08














- Physical Exam


Comments: 





03/02/18 09:11


General Appearance: Nourished. No Apparent Distress


HEENT: EOMI, EUGENE. No Pharyngeal Erythema, Tonsillar Exudate, Tonsillar Erythema


Neck: No Cervical Lymphadenopathy


Respiratory/Chest: Lungs Clear, Normal Breath Sounds. No Crackles, Rales, 

Rhonchi, Wheezing


Cardiovascular: Regular Rhythm, Regular Rate. 2/6 Systolic murmur noted on 

exam.  No Gallops, Rubs


Gastrointestinal/Abdominal: Normal Bowel Sounds, Soft. No Guarding, Rebound, 

Tenderness


Rectal Exam:  No external hemorrhoids noted.  Bright red blood noted externally 

on exam.  No internal abnormalities noted.


Musculoskeletal: No CVA Tenderness


Extremity: Normal Capillary Refill


Integumentary: Normal Color, Dry, Warm


Neurologic: Fully Oriented, Alert, Normal Mood/Affect, Normal Response, 





ED Treatment Course





- LABORATORY


CBC & Chemistry Diagram: 


 03/02/18 09:00





 03/02/18 09:00





Medical Decision Making





- Medical Decision Making





03/02/18 09:24


The patient is a 67 year old male with a history of HTN, HLD, COPD, MI s/p 

stenting 2013, Anemia who presents for evaluation of rectal bleeding.  Given 

the patient's physical exam, no hemorrhoids are noted and it is possible his 

bleeding is due to an internal cause within the colon.  We will send a cbc, cmp

, lipase, troponin, ekg, coags to evaluate further.  We will continue to 

monitor and reassess.  He will likely require admission for colonoscopy.





03/02/18 11:05


CBC, cmp, lipase, troponin are unremarkable.  Given the patient's symptoms we 

believe he requires admission and GI evaluation.  We discussed the case with 

Dr. Salcedo who accepted the patient for admission.





*DC/Admit/Observation/Transfer


Diagnosis at time of Disposition: 


 Rectal bleed








- Discharge Dispostion


Condition at time of disposition: Stable


Admit: Yes





- Referrals


Referrals: 


Shaji Salcedo MD [Primary Care Provider] - 





- Patient Instructions





- Post Discharge Activity

## 2018-03-02 NOTE — HP
Admitting History and Physical





- Admission


History of Present Illness: 





 67 year old male with a history of HTN, HLD, COPD, MI s/p stenting 2013, 

Anemia who presents for evaluation of rectal bleeding.  The patient reports 

intermittent BRBPR over the past month.  He notes that over the past 2 days he 

has been experiencing constant BRBPR with more frequent bowel movements that he 

notes this morning as consisting mostly of blood prompting his presentation to 

the ED for evaluation.  He notes that he is on aspirin and plavix.  He states 

that he had a colonoscopy 3-5 years ago that was reportedly unremarkable.  

Otherwise he denies any symptoms similar to this in the past.  He endorses some 

lightheadedness as well as some SOB which is unchanged from his baseline due to 

his COPD.  He otherwise denies fevers, chills, chest pain, nausea, vomiting, 

abdominal pain, pain with bowel movements, or changes with urination.





- Past Medical History


Cardiovascular: Yes: CAD (s/p stenting--LAD), HTN, Hyperlipdemia


Pulmonary: Yes: COPD, Other (lobectomy).  No: Asthma, Bronchitis, Cancer, O2 

Dependent, Pneumonia, Previously Intubated, Pulmonary Embolus, Pulmonary 

Fibrosis, Sleep Apnea


Gastrointestinal: Yes: GERD





- Smoking History


Smoking history: Never smoked


Have you smoked in the past 12 months: No


Aproximately how many cigarettes per day: 0


If you are a former smoker, when did you quit?: 2000





- Alcohol/Substance Use


Hx Alcohol Use: No





Home Medications





- Allergies


Allergies/Adverse Reactions: 


 Allergies











Allergy/AdvReac Type Severity Reaction Status Date / Time


 


levofloxacin [From Levaquin] AdvReac Severe Rash Verified 03/02/18 08:07














- Home Medications


Home Medications: 


Ambulatory Orders





Aspirin [ASA -] 81 mg PO DAILY 08/18/13 


Atorvastatin Ca [Lipitor] 40 mg PO HS 08/18/13 


Lisinopril [Prinivil] 40 mg PO BID 08/18/13 


Amlodipine Besylate [Norvasc -] 10 mg PO DAILY 12/09/13 


Ranolazine [Ranexa] 500 mg PO BID 12/09/13 


Pantoprazole Sodium [Protonix -] 40 mg PO DAILY 07/14/15 


Metoprolol Succinate [Toprol Xl] 50 mg PO DAILY 09/06/17 


Tamsulosin HCl [Flomax] 0.4 mg PO DAILY 09/06/17 


Polyethylene Glycol 3350 [Miralax 119 gm Btl -] 17 gm PO BID #2 bottle 03/02/18 











Review of Systems





- Review of Systems


Cardiovascular: reports: No Symptoms


Respiratory: reports: No Symptoms


Gastrointestinal: reports: Rectal Bleeding.  denies: Abdominal Pain, 

Constipation





Physical Examination


Vital Signs: 


 Vital Signs











Temperature  97.7 F   03/02/18 08:08


 


Pulse Rate  65   03/02/18 11:30


 


Respiratory Rate  16   03/02/18 11:30


 


Blood Pressure  133/74   03/02/18 11:30


 


O2 Sat by Pulse Oximetry (%)  98   03/02/18 11:30











Neck: Yes: Supple


Cardiovascular: Yes: Regular Rate and Rhythm


Respiratory: Yes: Regular, CTA Bilaterally


Gastrointestinal: Yes: Normal Bowel Sounds, Soft.  No: Tenderness


...Rectal Exam: Yes: Inflammation.  No: Hemorrhoids/External


Labs: 


 CBC, BMP





 03/02/18 13:10 





 03/02/18 09:00 











Problem List





- Problems


(1) Rectal bleed


Assessment/Plan: 


probably hemorrhoids


follow labs


colon as outpatient


Code(s): K62.5 - HEMORRHAGE OF ANUS AND RECTUM   





(2) CAD (coronary artery disease)


Assessment/Plan: 


hold asa and plavix


if hgb stable then resume asa


cardio-? need plavix stent > 2 yrs


Code(s): I25.10 - ATHSCL HEART DISEASE OF NATIVE CORONARY ARTERY W/O ANG PCTRS 

  





(3) Hypertension


Assessment/Plan: 


stable


same meds


Code(s): I10 - ESSENTIAL (PRIMARY) HYPERTENSION   





(4) Brain aneurysm


Assessment/Plan: 


S/P COIL


NO STENT


Code(s): I67.1 - CEREBRAL ANEURYSM, NONRUPTURED

## 2018-03-03 VITALS — TEMPERATURE: 98 F | HEART RATE: 76 BPM | DIASTOLIC BLOOD PRESSURE: 67 MMHG | SYSTOLIC BLOOD PRESSURE: 115 MMHG

## 2018-03-03 LAB
ANION GAP SERPL CALC-SCNC: 12 MMOL/L (ref 8–16)
BUN SERPL-MCNC: 14 MG/DL (ref 7–18)
CALCIUM SERPL-MCNC: 8.7 MG/DL (ref 8.5–10.1)
CHLORIDE SERPL-SCNC: 102 MMOL/L (ref 98–107)
CO2 SERPL-SCNC: 26 MMOL/L (ref 21–32)
CREAT SERPL-MCNC: 1.3 MG/DL (ref 0.7–1.3)
DEPRECATED RDW RBC AUTO: 13.6 % (ref 11.9–15.9)
GLUCOSE SERPL-MCNC: 108 MG/DL (ref 74–106)
HCT VFR BLD CALC: 35.5 % (ref 35.4–49)
HGB BLD-MCNC: 12.2 GM/DL (ref 11.7–16.9)
MCH RBC QN AUTO: 28.2 PG (ref 25.7–33.7)
MCHC RBC AUTO-ENTMCNC: 34.3 G/DL (ref 32–35.9)
MCV RBC: 82.4 FL (ref 80–96)
PLATELET # BLD AUTO: 214 K/MM3 (ref 134–434)
PMV BLD: 7.8 FL (ref 7.5–11.1)
POTASSIUM SERPLBLD-SCNC: 3.5 MMOL/L (ref 3.5–5.1)
RBC # BLD AUTO: 4.31 M/MM3 (ref 4–5.6)
SERUM IRON SATURATION: 20 % (ref 15–55)
SODIUM SERPL-SCNC: 140 MMOL/L (ref 136–145)
TIBC SERPL-MCNC: 347 UG/DL (ref 250–450)
UIBC SERPL-MCNC: 277 UG/DL (ref 111–343)
WBC # BLD AUTO: 6.4 K/MM3 (ref 4–10)

## 2018-03-03 RX ADMIN — LISINOPRIL SCH MG: 20 TABLET ORAL at 09:48

## 2018-03-03 RX ADMIN — POLYETHYLENE GLYCOL 3350 SCH GM: 17 POWDER, FOR SOLUTION ORAL at 10:09

## 2018-03-03 RX ADMIN — RANOLAZINE SCH MG: 500 TABLET, FILM COATED, EXTENDED RELEASE ORAL at 09:48

## 2018-03-03 RX ADMIN — POLYETHYLENE GLYCOL 3350 SCH: 17 POWDER, FOR SOLUTION ORAL at 09:49

## 2018-03-03 RX ADMIN — POTASSIUM CHLORIDE, DEXTROSE MONOHYDRATE AND SODIUM CHLORIDE SCH MLS/HR: 150; 5; 450 INJECTION, SOLUTION INTRAVENOUS at 04:58

## 2018-03-03 NOTE — DS
Physical Examination


Vital Signs: 


 Vital Signs











Temperature  97.9 F   03/03/18 06:13


 


Pulse Rate  59 L  03/03/18 06:13


 


Respiratory Rate  18   03/03/18 06:13


 


Blood Pressure  117/57   03/03/18 06:13


 


O2 Sat by Pulse Oximetry (%)  95   03/03/18 04:00











Cardiovascular: Yes: Regular Rate and Rhythm


Respiratory: Yes: Regular, CTA Bilaterally


Gastrointestinal: Yes: Normal Bowel Sounds, Soft.  No: Tenderness


Labs: 


 CBC, BMP





 03/03/18 06:20 





 03/03/18 06:20 











Discharge Summary


Reason For Visit: RECTAL HEMORRHAGE


Current Active Problems





Rectal bleed (Acute)








Hospital Course: 





 67 year old male with a history of HTN, HLD, COPD, MI s/p stenting 2013, 

Anemia who presents for evaluation of rectal bleeding.  The patient reports 

intermittent BRBPR over the past month.  He notes that over the past 2 days he 

has been experiencing constant BRBPR with more frequent bowel movements that he 

notes this morning as consisting mostly of blood prompting his presentation to 

the ED for evaluation.  He notes that he is on aspirin and plavix.  He states 

that he had a colonoscopy 3-5 years ago that was reportedly unremarkable.  

Otherwise he denies any symptoms similar to this in the past.  He endorses some 

lightheadedness as well as some SOB which is unchanged from his baseline due to 

his COPD.  He otherwise denies fevers, chills, chest pain, nausea, vomiting, 

abdominal pain, pain with bowel movements, or changes with urination.





- Past Medical History


Cardiovascular: Yes: CAD (s/p stenting--LAD), HTN, Hyperlipdemia


Pulmonary: Yes: COPD, Other (lobectomy).  No: Asthma, Bronchitis, Cancer, O2 

Dependent, Pneumonia, Previously Intubated, Pulmonary Embolus, Pulmonary 

Fibrosis, Sleep Apnea


Gastrointestinal: Yes: GERD








- Problems


(1) Rectal bleed


Assessment/Plan: 


probably hemorrhoids


follow labs


colon as outpatient


Code(s): K62.5 - HEMORRHAGE OF ANUS AND RECTUM   





(2) CAD (coronary artery disease)


Assessment/Plan: 


hold  plavix


ASA 81 mg 


hgb stable t resume asa


cardio-noted- need plavix stent > 2 yrs


Code(s): I25.10 - ATHSCL HEART DISEASE OF NATIVE CORONARY ARTERY W/O ANG PCTRS 

  





(3) Hypertension


Assessment/Plan: 


stable


same meds


Code(s): I10 - ESSENTIAL (PRIMARY) HYPERTENSION   





(4) Brain aneurysm


Assessment/Plan: 


S/P COIL


NO STENT


Code(s): I67.1 - CEREBRAL ANEURYSM, NONRUPTURED   





Condition: Stable





- Instructions


Referrals: 


Shaji Salcedo MD [Primary Care Provider] - 


Faith Aceves MD [Staff Physician] - 


Disposition: HOME





- Home Medications


Comprehensive Discharge Medication List: 


Ambulatory Orders





Aspirin [ASA -] 81 mg PO DAILY 08/18/13 


Atorvastatin Ca [Lipitor] 40 mg PO HS 08/18/13 


Lisinopril [Prinivil] 40 mg PO BID 08/18/13 


Amlodipine Besylate [Norvasc -] 10 mg PO DAILY 12/09/13 


Ranolazine [Ranexa] 500 mg PO BID 12/09/13 


Pantoprazole Sodium [Protonix -] 40 mg PO DAILY 07/14/15 


Metoprolol Succinate [Toprol Xl] 50 mg PO DAILY 09/06/17 


Tamsulosin HCl [Flomax] 0.4 mg PO DAILY 09/06/17 


Polyethylene Glycol 3350 [Miralax 119 gm Btl -] 17 gm PO BID #2 bottle 03/02/18

## 2018-03-03 NOTE — EKG
Test Reason : 

Blood Pressure : ***/*** mmHG

Vent. Rate : 063 BPM     Atrial Rate : 063 BPM

   P-R Int : 186 ms          QRS Dur : 120 ms

    QT Int : 424 ms       P-R-T Axes : 053 -48 016 degrees

   QTc Int : 433 ms

 

NORMAL SINUS RHYTHM

LEFT ANTERIOR FASCICULAR BLOCK

ABNORMAL ECG

Confirmed by MD MARNIE, JESS ([id number]) on 3/3/2018 11:49:04 AM

 

Referred By:             Confirmed By:JESS DYE MD

## 2018-03-20 ENCOUNTER — HOSPITAL ENCOUNTER (OUTPATIENT)
Dept: HOSPITAL 74 - JASU-ENDO | Age: 68
Discharge: HOME | End: 2018-03-20
Attending: INTERNAL MEDICINE
Payer: COMMERCIAL

## 2018-03-20 VITALS — DIASTOLIC BLOOD PRESSURE: 70 MMHG | HEART RATE: 58 BPM | SYSTOLIC BLOOD PRESSURE: 121 MMHG

## 2018-03-20 VITALS — TEMPERATURE: 97.8 F

## 2018-03-20 VITALS — BODY MASS INDEX: 29 KG/M2

## 2018-03-20 DIAGNOSIS — K55.20: Primary | ICD-10-CM

## 2018-03-20 DIAGNOSIS — K64.8: ICD-10-CM

## 2018-03-20 DIAGNOSIS — K57.30: ICD-10-CM

## 2018-03-20 PROCEDURE — 0D5H8ZZ DESTRUCTION OF CECUM, VIA NATURAL OR ARTIFICIAL OPENING ENDOSCOPIC: ICD-10-PCS | Performed by: INTERNAL MEDICINE

## 2019-08-23 ENCOUNTER — HOSPITAL ENCOUNTER (OUTPATIENT)
Dept: HOSPITAL 74 - JASU-SURG | Age: 69
Discharge: HOME | End: 2019-08-23
Attending: SURGERY
Payer: COMMERCIAL

## 2019-08-23 VITALS — DIASTOLIC BLOOD PRESSURE: 57 MMHG | SYSTOLIC BLOOD PRESSURE: 114 MMHG | TEMPERATURE: 99.1 F | HEART RATE: 71 BPM

## 2019-08-23 VITALS — BODY MASS INDEX: 27.1 KG/M2

## 2019-08-23 DIAGNOSIS — D21.0: Primary | ICD-10-CM

## 2019-08-23 DIAGNOSIS — D21.6: ICD-10-CM

## 2019-08-23 PROCEDURE — 0JB40ZZ EXCISION OF RIGHT NECK SUBCUTANEOUS TISSUE AND FASCIA, OPEN APPROACH: ICD-10-PCS | Performed by: SURGERY

## 2019-08-23 PROCEDURE — 0JB70ZZ EXCISION OF BACK SUBCUTANEOUS TISSUE AND FASCIA, OPEN APPROACH: ICD-10-PCS | Performed by: SURGERY

## 2019-08-23 NOTE — OP
DATE OF OPERATION:  08/23/2019

 

SURGICAL ATTENDING:  Shayan Posey MD 

 

PREOPERATIVE DIAGNOSES: 

1.  Right posterior neck mass.

2.  Left upper back mass.

 

POSTOPERATIVE DIAGNOSES: 

1.  Right posterior neck mass.

2.  Left upper back mass.

 

ANESTHESIA:  Local with sedation.

 

PROCEDURES:

1.  Right posterior neck mass excision.

2.  Left upper back mass excision.

 

DESCRIPTION OF PROCEDURE:  The patient was taken into the operating room, placed in

the prone position, head in a horseshoe head rest.  Arms were tucked.  All pressure

points were checked and protected.  IV antibiotics and IV sedation were given.  

 

The neck and back were then prepped and draped in the usual sterile fashion in 1

field.  Local anesthesia was administered.  The posterior neck mass was identified,

and a 4-cm incision was made and carried down through subcutaneous tissues.  The mass

was identified beneath the muscular layer, excised, and sent to Pathology.  It was

fatty in appearance.  Hemostasis was achieved.  The wound was closed in 3 layers. 

Dermabond was placed.  

 

The left upper back mass was then identified.  Local anesthesia was infiltrated.  A

4-cm incision was made, carried down through subcutaneous tissues.  Flaps were raised

superiorly and inferiorly, and a block of fatty tissue was removed.  Hemostasis was

achieved.  The wound was then closed in 2 layers.  Dermabond was placed.  The patient

was then awaken and taken to recovery in stable condition.  

 

Dr. Posey, the attending surgeon, was present throughout the entire procedure.

 

 

SHAYAN POSEY M.D.

 

[id number]

DD: 08/23/2019 12:49

DT: 08/23/2019 13:53

Job #:  [id number]

## 2019-08-29 NOTE — PATH
Surgical Pathology Report



Patient Name:  ARON DONOHUE

Accession #:  [id number]

Med. Rec. #:  [id number]                                                        

   /Age/Gender:  1950 (Age: 69) / M

Account:  [id number]                                                          

             Location: [facility] SURGICAL

Taken:  2019

Received:  2019

Reported:  2019

Physicians:  Ronald Palma M.D.

  



Specimen(s) Received

A: RIGHT POSTERIOR NECK MASS 

B: LEFT UPPER  BACK MASS 





Clinical History

Right posterior neck mass in left upper back mass







Final Diagnosis

A. RIGHT POSTERIOR NECK MASS, EXCISION:

MATURE ADIPOSE TISSUE CONSISTENT WITH LIPOMA.

SMALL PORTION OF SKELETAL MUSCLE WITH NO DIAGNOSTIC ABNORMALITIES.



B. LEFT UPPER BACK MASS, EXCISION:

MATURE ADIPOSE TISSUE ADMIXED WITH DENSE FIBROTIC BUNDLES, CONSISTENT WITH

FIBROLIPOMA.







***Electronically Signed***

Yamilet Anderson M.D.





Gross Description

A. Received in formalin labeled "right posterior neck mass" is a yellow,

lobulated, adipose tissue measuring 3 x 3 x 1 cm. The specimen is undesignated.

The entire outer surface is inked in black. Cut section shows homogenous

surface. No hemorrhage or necrosis identified. Representative sections are

submitted in one cassette.



B.  Received in formalin labeled "left upper back mass" is a yellow, lobulated,

adipose tissue measuring 4 x 3 x 1.5 cm. The specimen is undesignated. The

entire outer surface is inked in black. No hemorrhage or necrosis identified.

Representative sections are submitted in two cassettes.

MLSZ/2019





delaney/[email or web address]

## 2019-10-15 ENCOUNTER — HOSPITAL ENCOUNTER (OUTPATIENT)
Dept: HOSPITAL 74 - JASU-SURG | Age: 69
Discharge: HOME | End: 2019-10-15
Attending: INTERNAL MEDICINE
Payer: COMMERCIAL

## 2019-10-15 VITALS — SYSTOLIC BLOOD PRESSURE: 151 MMHG | HEART RATE: 56 BPM | DIASTOLIC BLOOD PRESSURE: 75 MMHG

## 2019-10-15 VITALS — BODY MASS INDEX: 29.2 KG/M2

## 2019-10-15 VITALS — TEMPERATURE: 98 F

## 2019-10-15 DIAGNOSIS — Z87.442: ICD-10-CM

## 2019-10-15 DIAGNOSIS — N40.0: ICD-10-CM

## 2019-10-15 DIAGNOSIS — K55.20: ICD-10-CM

## 2019-10-15 DIAGNOSIS — D50.9: Primary | ICD-10-CM

## 2019-10-15 DIAGNOSIS — K57.30: ICD-10-CM

## 2019-10-15 DIAGNOSIS — I10: ICD-10-CM

## 2019-10-15 DIAGNOSIS — Z86.79: ICD-10-CM

## 2019-10-15 DIAGNOSIS — I25.10: ICD-10-CM

## 2019-10-15 DIAGNOSIS — K64.8: ICD-10-CM

## 2019-10-15 DIAGNOSIS — E78.00: ICD-10-CM

## 2019-10-15 PROCEDURE — 0D5H8ZZ DESTRUCTION OF CECUM, VIA NATURAL OR ARTIFICIAL OPENING ENDOSCOPIC: ICD-10-PCS | Performed by: INTERNAL MEDICINE

## 2019-10-15 PROCEDURE — 0DB68ZX EXCISION OF STOMACH, VIA NATURAL OR ARTIFICIAL OPENING ENDOSCOPIC, DIAGNOSTIC: ICD-10-PCS | Performed by: INTERNAL MEDICINE

## 2019-10-16 NOTE — PATH
Surgical Pathology Report



Patient Name:  ARON DONOHUE

Accession #:  [id number]

Med. Rec. #:  [id number]                                                        

   /Age/Gender:  1950 (Age: 69) / M

Account:  [id number]                                                          

             Location: [facility]-ENDOSCOPY

Taken:  10/15/2019

Received:  10/15/2019

Reported:  10/16/2019

Physicians:  Sanchez Roach M.D.

  



Specimen(s) Received

 STOMACH, BODY AND ANTRUM 





Clinical History

Anemia

Postoperative diagnosis: Gastritis, diverticulosis, AVM cecum







Final Diagnosis

STOMACH, BODY AND ANTRUM, BIOPSY:

GASTRIC MUCOSA WITH MILD CHRONIC GASTRITIS.

IMMUNOHISTOCHEMICAL STAIN FOR H. PYLORI IS NEGATIVE.





***Electronically Signed***

Sera Wharton M.D.





Gross Description

Received in formalin, labeled "stomach body and antrum" are 7 tan, irregular

portions of soft tissue ranging in size from 0.1-0.7 cm. in greatest dimension.

The specimens are submitted in toto in one cassette.

MLSZ/10/15/2019



sansaad/10/15/2019

## 2022-07-25 ENCOUNTER — HOSPITAL ENCOUNTER (EMERGENCY)
Dept: HOSPITAL 74 - JER | Age: 72
Discharge: HOME | End: 2022-07-25
Payer: COMMERCIAL

## 2022-07-25 VITALS — BODY MASS INDEX: 27.4 KG/M2

## 2022-07-25 VITALS
HEART RATE: 59 BPM | DIASTOLIC BLOOD PRESSURE: 71 MMHG | SYSTOLIC BLOOD PRESSURE: 136 MMHG | RESPIRATION RATE: 18 BRPM | TEMPERATURE: 98.2 F

## 2022-07-25 DIAGNOSIS — U07.1: Primary | ICD-10-CM

## 2022-07-25 PROCEDURE — M0222: HCPCS

## 2025-04-29 ENCOUNTER — HOSPITAL ENCOUNTER (INPATIENT)
Dept: HOSPITAL 74 - JER | Age: 75
LOS: 5 days | Discharge: HOME | DRG: 909 | End: 2025-05-04
Attending: FAMILY MEDICINE | Admitting: HOSPITALIST
Payer: COMMERCIAL

## 2025-04-29 VITALS — BODY MASS INDEX: 27.3 KG/M2

## 2025-04-29 DIAGNOSIS — R33.9: ICD-10-CM

## 2025-04-29 DIAGNOSIS — I25.10: ICD-10-CM

## 2025-04-29 DIAGNOSIS — I25.2: ICD-10-CM

## 2025-04-29 DIAGNOSIS — R31.0: ICD-10-CM

## 2025-04-29 DIAGNOSIS — N99.820: Primary | ICD-10-CM

## 2025-04-29 DIAGNOSIS — I10: ICD-10-CM

## 2025-04-29 DIAGNOSIS — K21.9: ICD-10-CM

## 2025-04-29 DIAGNOSIS — J44.9: ICD-10-CM

## 2025-04-29 DIAGNOSIS — E78.5: ICD-10-CM

## 2025-04-29 DIAGNOSIS — R91.1: ICD-10-CM

## 2025-04-29 LAB
ALBUMIN SERPL-MCNC: 3.7 G/DL (ref 3.4–5)
APPEARANCE UR: (no result)
APTT BLD: 29.8 SECONDS (ref 25.2–36.5)
BASOPHILS # BLD AUTO: 0.06 X10^3/UL (ref 0.01–0.08)
BILIRUB SERPL-MCNC: 0.8 MG/DL (ref 0.2–1)
BILIRUB UR STRIP.AUTO-MCNC: NEGATIVE MG/DL
BUN SERPL-MCNC: 18.7 MG/DL (ref 7–18)
CALCIUM SERPL-MCNC: 9.3 MG/DL (ref 8.5–10.1)
COLOR UR: YELLOW
CREAT SERPL-MCNC: 1.1 MG/DL (ref 0.55–1.3)
EOSINOPHIL # BLD AUTO: 0.34 X10^3/UL (ref 0.04–0.54)
EOSINOPHIL NFR BLD AUTO: 4.6 % (ref 0.8–7)
ERYTHROCYTE [DISTWIDTH] IN BLOOD: 12.8 % (ref 12.2–16.6)
HCT VFR BLD CALC: 41.9 % (ref 40.1–51)
HGB BLD-MCNC: 13.9 G/DL (ref 13.7–17.5)
IMM GRANULOCYTES # BLD: 0.02 X10^3/UL (ref 0–0.03)
INR BLD: 1.22 (ref 0.83–1.09)
KETONES UR QL STRIP: NEGATIVE
LEUKOCYTE ESTERASE UR QL STRIP.AUTO: NEGATIVE
MCHC RBC-ENTMCNC: 33.2 G/DL (ref 32.3–36.5)
MCV RBC: 81.8 FL (ref 79–92.2)
MONOCYTES # BLD AUTO: 0.73 X10^3/UL (ref 0.3–0.82)
MONOCYTES NFR BLD AUTO: 9.9 % (ref 5.3–12.2)
NITRITE UR QL STRIP: NEGATIVE
PLATELET # BLD AUTO: 225 X10^3/UL (ref 163–337)
PMV BLD: 8.8 FL (ref 9.4–12.4)
POTASSIUM SERPLBLD-SCNC: 3.8 MMOL/L (ref 3.5–5.1)
PROT SERPL-MCNC: 6.8 G/DL (ref 6.4–8.2)
PROT UR QL STRIP: (no result)
PROT UR QL STRIP: NEGATIVE
PT PNL PPP: 13.4 SEC (ref 9.7–13)
SP GR UR: 1.02 (ref 1.01–1.03)
UROBILINOGEN UR STRIP-MCNC: 0.2 MG/DL (ref 0.2–1)

## 2025-04-29 PROCEDURE — G0378 HOSPITAL OBSERVATION PER HR: HCPCS

## 2025-04-29 RX ADMIN — TAMSULOSIN HYDROCHLORIDE SCH MG: 0.4 CAPSULE ORAL at 10:19

## 2025-04-29 RX ADMIN — PANTOPRAZOLE SODIUM SCH MG: 40 TABLET, DELAYED RELEASE ORAL at 10:19

## 2025-04-29 RX ADMIN — RAMIPRIL SCH MG: 5 CAPSULE ORAL at 10:19

## 2025-04-29 RX ADMIN — AMLODIPINE BESYLATE SCH MG: 5 TABLET ORAL at 10:19

## 2025-04-29 RX ADMIN — POTASSIUM CHLORIDE, DEXTROSE MONOHYDRATE AND SODIUM CHLORIDE SCH MLS/HR: 150; 5; 450 INJECTION, SOLUTION INTRAVENOUS at 06:28

## 2025-04-29 RX ADMIN — CARVEDILOL SCH MG: 25 TABLET, FILM COATED ORAL at 10:19

## 2025-04-29 RX ADMIN — ACETAMINOPHEN ONE: 10 INJECTION, SOLUTION INTRAVENOUS at 02:45

## 2025-04-29 RX ADMIN — ROSUVASTATIN CALCIUM SCH MG: 20 TABLET, FILM COATED ORAL at 21:19

## 2025-04-30 LAB
ALBUMIN SERPL-MCNC: 3.2 G/DL (ref 3.4–5)
BASOPHILS # BLD AUTO: 0.05 X10^3/UL (ref 0.01–0.08)
BUN SERPL-MCNC: 13.9 MG/DL (ref 7–18)
CALCIUM SERPL-MCNC: 9.1 MG/DL (ref 8.5–10.1)
CREAT SERPL-MCNC: 0.9 MG/DL (ref 0.55–1.3)
EOSINOPHIL NFR BLD AUTO: 3.8 % (ref 0.8–7)
ERYTHROCYTE [DISTWIDTH] IN BLOOD: 12.9 % (ref 12.2–16.6)
HCT VFR BLD CALC: 36.9 % (ref 40.1–51)
IMM GRANULOCYTES # BLD: 0.02 X10^3/UL (ref 0–0.03)
MAGNESIUM SERPL-MCNC: 2.3 MG/DL (ref 1.8–2.4)
MCHC RBC-ENTMCNC: 32.5 G/DL (ref 32.3–36.5)
MCV RBC: 82.2 FL (ref 79–92.2)
MONOCYTES # BLD AUTO: 0.84 X10^3/UL (ref 0.3–0.82)
MONOCYTES NFR BLD AUTO: 10.7 % (ref 5.3–12.2)
PLATELET # BLD AUTO: 202 X10^3/UL (ref 163–337)
PMV BLD: 9.2 FL (ref 9.4–12.4)
POTASSIUM SERPLBLD-SCNC: 3.7 MMOL/L (ref 3.5–5.1)
PROT SERPL-MCNC: 5.9 G/DL (ref 6.4–8.2)

## 2025-04-30 RX ADMIN — DOCUSATE SODIUM PRN MG: 100 CAPSULE, LIQUID FILLED ORAL at 17:38

## 2025-04-30 RX ADMIN — ACETAMINOPHEN PRN MG: 10 INJECTION, SOLUTION INTRAVENOUS at 00:33

## 2025-04-30 RX ADMIN — ACETAMINOPHEN PRN MG: 325 TABLET ORAL at 22:07

## 2025-05-01 LAB
BASOPHILS # BLD AUTO: 0.05 X10^3/UL (ref 0.01–0.08)
EOSINOPHIL # BLD AUTO: 0.23 X10^3/UL (ref 0.04–0.54)
EOSINOPHIL NFR BLD AUTO: 2.1 % (ref 0.8–7)
ERYTHROCYTE [DISTWIDTH] IN BLOOD: 12.9 % (ref 12.2–16.6)
ERYTHROCYTE [DISTWIDTH] IN BLOOD: 12.9 % (ref 12.2–16.6)
HCT VFR BLD CALC: 36.3 % (ref 40.1–51)
HCT VFR BLD CALC: 36.4 % (ref 40.1–51)
HGB BLD-MCNC: 11.8 G/DL (ref 13.7–17.5)
HGB BLD-MCNC: 12.1 G/DL (ref 13.7–17.5)
IMM GRANULOCYTES # BLD: 0.07 X10^3/UL (ref 0–0.03)
MCHC RBC-ENTMCNC: 32.5 G/DL (ref 32.3–36.5)
MCHC RBC-ENTMCNC: 33.2 G/DL (ref 32.3–36.5)
MCV RBC: 82.4 FL (ref 79–92.2)
MCV RBC: 82.7 FL (ref 79–92.2)
MONOCYTES NFR BLD AUTO: 7.4 % (ref 5.3–12.2)
PLATELET # BLD AUTO: 220 X10^3/UL (ref 163–337)
PLATELET # BLD AUTO: 231 X10^3/UL (ref 163–337)
PMV BLD: 9.2 FL (ref 9.4–12.4)
PMV BLD: 9.4 FL (ref 9.4–12.4)

## 2025-05-01 PROCEDURE — 3E1K78Z IRRIGATION OF GENITOURINARY TRACT USING IRRIGATING SUBSTANCE, VIA NATURAL OR ARTIFICIAL OPENING: ICD-10-PCS | Performed by: STUDENT IN AN ORGANIZED HEALTH CARE EDUCATION/TRAINING PROGRAM

## 2025-05-01 PROCEDURE — 0TCB8ZZ EXTIRPATION OF MATTER FROM BLADDER, VIA NATURAL OR ARTIFICIAL OPENING ENDOSCOPIC: ICD-10-PCS | Performed by: STUDENT IN AN ORGANIZED HEALTH CARE EDUCATION/TRAINING PROGRAM

## 2025-05-01 PROCEDURE — 0W3R8ZZ CONTROL BLEEDING IN GENITOURINARY TRACT, VIA NATURAL OR ARTIFICIAL OPENING ENDOSCOPIC: ICD-10-PCS | Performed by: STUDENT IN AN ORGANIZED HEALTH CARE EDUCATION/TRAINING PROGRAM

## 2025-05-01 RX ADMIN — ACETAMINOPHEN ONE MG: 10 INJECTION, SOLUTION INTRAVENOUS at 05:14

## 2025-05-01 RX ADMIN — TAMSULOSIN HYDROCHLORIDE ONE MG: 0.4 CAPSULE ORAL at 03:59

## 2025-05-01 RX ADMIN — RAMIPRIL SCH MG: 5 CAPSULE ORAL at 21:53

## 2025-05-01 RX ADMIN — POTASSIUM CHLORIDE, DEXTROSE MONOHYDRATE AND SODIUM CHLORIDE SCH MLS: 150; 5; 450 INJECTION, SOLUTION INTRAVENOUS at 12:20

## 2025-05-01 RX ADMIN — HYDROMORPHONE HYDROCHLORIDE ONE MG: 2 INJECTION, SOLUTION INTRAMUSCULAR; INTRAVENOUS; SUBCUTANEOUS at 06:13

## 2025-05-01 RX ADMIN — CARVEDILOL SCH MG: 25 TABLET, FILM COATED ORAL at 21:54

## 2025-05-01 RX ADMIN — ROSUVASTATIN CALCIUM SCH MG: 20 TABLET, FILM COATED ORAL at 21:53

## 2025-05-01 RX ADMIN — CEFTRIAXONE ONE MLS/HR: 2 INJECTION, SOLUTION INTRAVENOUS at 08:13

## 2025-05-02 RX ADMIN — AMLODIPINE BESYLATE SCH MG: 5 TABLET ORAL at 09:48

## 2025-05-02 RX ADMIN — ERTAPENEM SODIUM SCH MLS/HR: 1 INJECTION, POWDER, LYOPHILIZED, FOR SOLUTION INTRAMUSCULAR; INTRAVENOUS at 09:49

## 2025-05-02 RX ADMIN — TAMSULOSIN HYDROCHLORIDE SCH MG: 0.4 CAPSULE ORAL at 08:29

## 2025-05-02 RX ADMIN — PANTOPRAZOLE SODIUM SCH MG: 40 TABLET, DELAYED RELEASE ORAL at 09:48

## 2025-05-03 LAB
ALBUMIN SERPL-MCNC: 3.2 G/DL (ref 3.4–5)
BASOPHILS # BLD AUTO: 0.05 X10^3/UL (ref 0.01–0.08)
BILIRUB SERPL-MCNC: 0.9 MG/DL (ref 0.2–1)
BUN SERPL-MCNC: 12.9 MG/DL (ref 7–18)
CALCIUM SERPL-MCNC: 8.9 MG/DL (ref 8.5–10.1)
CREAT SERPL-MCNC: 0.9 MG/DL (ref 0.55–1.3)
EOSINOPHIL # BLD AUTO: 0.32 X10^3/UL (ref 0.04–0.54)
EOSINOPHIL NFR BLD AUTO: 2.9 % (ref 0.8–7)
ERYTHROCYTE [DISTWIDTH] IN BLOOD: 13.3 % (ref 12.2–16.6)
HCT VFR BLD CALC: 32.5 % (ref 40.1–51)
HGB BLD-MCNC: 10.6 G/DL (ref 13.7–17.5)
IMM GRANULOCYTES # BLD: 0.04 X10^3/UL (ref 0–0.03)
MCHC RBC-ENTMCNC: 32.6 G/DL (ref 32.3–36.5)
MCV RBC: 83.3 FL (ref 79–92.2)
MONOCYTES # BLD AUTO: 1.21 X10^3/UL (ref 0.3–0.82)
MONOCYTES NFR BLD AUTO: 11.1 % (ref 5.3–12.2)
PLATELET # BLD AUTO: 200 X10^3/UL (ref 163–337)
PMV BLD: 9.2 FL (ref 9.4–12.4)
POTASSIUM SERPLBLD-SCNC: 3.9 MMOL/L (ref 3.5–5.1)
PROT SERPL-MCNC: 5.8 G/DL (ref 6.4–8.2)

## 2025-05-03 RX ADMIN — DUTASTERIDE ONE MG: 0.5 CAPSULE, LIQUID FILLED ORAL at 18:41

## 2025-05-04 VITALS — RESPIRATION RATE: 18 BRPM

## 2025-05-04 VITALS — HEART RATE: 65 BPM | SYSTOLIC BLOOD PRESSURE: 135 MMHG | DIASTOLIC BLOOD PRESSURE: 64 MMHG | TEMPERATURE: 98.2 F
